# Patient Record
Sex: FEMALE | Race: WHITE | Employment: OTHER | URBAN - METROPOLITAN AREA
[De-identification: names, ages, dates, MRNs, and addresses within clinical notes are randomized per-mention and may not be internally consistent; named-entity substitution may affect disease eponyms.]

---

## 2019-09-05 ENCOUNTER — OFFICE VISIT (OUTPATIENT)
Dept: DERMATOLOGY | Facility: CLINIC | Age: 79
End: 2019-09-05
Payer: COMMERCIAL

## 2019-09-05 VITALS — SYSTOLIC BLOOD PRESSURE: 137 MMHG | HEART RATE: 55 BPM | OXYGEN SATURATION: 99 % | DIASTOLIC BLOOD PRESSURE: 70 MMHG

## 2019-09-05 DIAGNOSIS — L81.4 LENTIGO: ICD-10-CM

## 2019-09-05 DIAGNOSIS — L82.1 SEBORRHEIC KERATOSIS: ICD-10-CM

## 2019-09-05 DIAGNOSIS — D18.01 CHERRY ANGIOMA: ICD-10-CM

## 2019-09-05 DIAGNOSIS — D22.9 MULTIPLE BENIGN NEVI: ICD-10-CM

## 2019-09-05 DIAGNOSIS — L82.0 INFLAMED SEBORRHEIC KERATOSIS: Primary | ICD-10-CM

## 2019-09-05 PROCEDURE — 99203 OFFICE O/P NEW LOW 30 MIN: CPT | Mod: 25 | Performed by: PHYSICIAN ASSISTANT

## 2019-09-05 PROCEDURE — 17110 DESTRUCTION B9 LES UP TO 14: CPT | Performed by: PHYSICIAN ASSISTANT

## 2019-09-05 RX ORDER — MULTIVIT-MIN/IRON/FA/VIT K/LUT 8MG-400MCG
TABLET ORAL
COMMUNITY
Start: 2011-02-18

## 2019-09-05 RX ORDER — LISINOPRIL 20 MG/1
20 TABLET ORAL
COMMUNITY
Start: 2019-05-28

## 2019-09-05 RX ORDER — ATENOLOL 50 MG/1
50 TABLET ORAL
COMMUNITY
Start: 2019-05-28

## 2019-09-05 NOTE — PATIENT INSTRUCTIONS
WOUND CARE INSTRUCTIONS   FOR CRYOSURGERY   Left breast x2  This area treated with liquid nitrogen should form a blister (areas treated may or may not blister-skin may just turn dark and slough off). You do not need to bandage the area unless a blister forms and breaks (which may be a few days). When the blister breaks, begin daily dressing changes as follows:  1) Clean and dry the area with tap water using clean Q-tip or sterile gauze pad.   2) Apply Polysporin ointment or Bacitracin ointment over entire wound. Do NOT use Neosporin ointment.   3) Cover the wound with a band-aid or sterile non-stick gauze pad and micropore paper tape.   REPEAT THESE INSTRUCTIONS AT LEAST ONCE A DAY UNTIL THE WOUND HAS COMPLETELY HEALED.   It is an old wives tale that a wound heals better when it is exposed to air and allowed to dry out. The wound will heal faster with a better cosmetic result if it is kept moist with ointment and covered with a bandage.   Do not let the wound dry out.   IMPORTANT INFORMATION ON REVERSE SIDE   Supplies Needed:   *Cotton tipped applicators (Q-tips)   *Polysporin ointment or Bacitracin ointment (NOT NEOSPORIN)   *Band-aids, or non stick gauze pads and micropore paper tape   PATIENT INFORMATION   During the healing process you will notice a number of changes. All wounds develop a small halo of redness surrounding the wound. This means healing is occurring. Severe itching with extensive redness usually indicates sensitivity to the ointment or bandage tape used to dress the wound. You should call our office if this develops.   Swelling and/or discoloration around your surgical site is common, particularly when performed around the eye.   All wounds normally drain. The larger the wound the more drainage there will be. After 7-10 days, you will notice the wound beginning to shrink and new skin will begin to grow. The wound is healed when you can see skin has formed over the entire area. A healed wound has a  healthy, shiny look to the surface and is red to dark pink in color to normalize. Wounds may take approximately 4-6 weeks to heal. Larger wounds may take 6-8 weeks. After the wound is healed you may discontinue dressing changes.   You may experience a sensation of tightness as your wound heals. This is normal and will gradually subside.   Your healed wound may be sensitive to temperature changes. This sensitivity improves with time, but if you re having a lot of discomfort, try to avoid temperature extremes.   Patients frequently experience itching after their wound appears to have healed because of the continue healing under the skin. Plain Vaseline will help relieve the itching.

## 2019-09-05 NOTE — PROGRESS NOTES
Radha Chao is a 78 year old year old female patient here today for spot on left breast.  Patient states this has been present for many months.  Patient reports the following symptoms:  itchy.  Patient reports the following previous treatments none. Patient notes she has had a few benign spots removed. Has  Patient reports the following modifying factors none.  Associated symptoms: none.  Patient has no other skin complaints today.  Remainder of the HPI, Meds, PMH, Allergies, FH, and SH was reviewed in chart.    Pertinent Hx:   No personal history of skin cancer.   History reviewed. No pertinent past medical history.    History reviewed. No pertinent surgical history.     Family History   Problem Relation Age of Onset     Cancer Mother         BCC and SCC     Cancer Father         BCC and SCC     Cancer Brother         BCC and SCC       Social History     Socioeconomic History     Marital status: Single     Spouse name: Not on file     Number of children: Not on file     Years of education: Not on file     Highest education level: Not on file   Occupational History     Not on file   Social Needs     Financial resource strain: Not on file     Food insecurity:     Worry: Not on file     Inability: Not on file     Transportation needs:     Medical: Not on file     Non-medical: Not on file   Tobacco Use     Smoking status: Former Smoker     Smokeless tobacco: Never Used   Substance and Sexual Activity     Alcohol use: Not on file     Drug use: Not on file     Sexual activity: Not on file   Lifestyle     Physical activity:     Days per week: Not on file     Minutes per session: Not on file     Stress: Not on file   Relationships     Social connections:     Talks on phone: Not on file     Gets together: Not on file     Attends Yazidi service: Not on file     Active member of club or organization: Not on file     Attends meetings of clubs or organizations: Not on file     Relationship status: Not on file     Intimate  partner violence:     Fear of current or ex partner: Not on file     Emotionally abused: Not on file     Physically abused: Not on file     Forced sexual activity: Not on file   Other Topics Concern     Not on file   Social History Narrative     Not on file       Outpatient Encounter Medications as of 9/5/2019   Medication Sig Dispense Refill     atenolol (TENORMIN) 50 MG tablet Take 50 mg by mouth       calcium carbonate 600 mg-vitamin D 400 units (CALTRATE) 600-400 MG-UNIT per tablet Take 1 tablet by mouth       lisinopril (PRINIVIL/ZESTRIL) 20 MG tablet Take 20 mg by mouth       Multiple Vitamins-Minerals (CENTRUM SILVER ULTRA WOMENS) TABS        Multiple Vitamins-Minerals (ICAPS AREDS 2 PO) Take 1 capsule by mouth       ranitidine (ZANTAC) 150 MG capsule Take 150 mg by mouth       No facility-administered encounter medications on file as of 9/5/2019.              Review Of Systems  Skin: As above  Eyes: negative  Ears/Nose/Throat: negative  Respiratory: No shortness of breath, dyspnea on exertion, cough, or hemoptysis  Cardiovascular: negative  Gastrointestinal: negative  Genitourinary: negative  Musculoskeletal: negative  Neurologic: negative  Psychiatric: negative  Hematologic/Lymphatic/Immunologic: negative  Endocrine: negative      O:   NAD, WDWN, Alert & Oriented, Mood & Affect wnl, Vitals stable   Here today alone   /70 (BP Location: Right arm, Patient Position: Sitting, Cuff Size: Adult Regular)   Pulse 55   SpO2 99%    General appearance normal   Vitals stable   Alert, oriented and in no acute distress       Stuck on papules and brown macules on trunk and ext   Red papules on trunk  Brown papules and macules with regular pigment network and borders on torso and extremities      The remainder of the detailed exam was unremarkable; the following areas were examined:  scalp/hair, conjunctiva/lids, face, neck, lips/teeth, oral mucosa/gingiva, chest, back, abdomen, buttocks, digits/nails, RUE, LUE,  RLE, LLE.      Eyes: Conjunctivae/lids:Normal     ENT: Lips: normal    MSK:Normal    Cardiovascular: peripheral edema none    Pulm: Breathing Normal    Neuro/Psych: Orientation:Normal; Mood/Affect:Normal  A/P:  1. Inflamed seborrheic keratosis on left chest x2  LN2:  Treated with LN2 for 5s for 1-2 cycles. Warned risks of blistering, pain, pigment change, scarring, and incomplete resolution.  Advised patient to return if lesions do not completely resolve.  Wound care sheet given.  3. Seborrheic keratosis, lentigo, angioma, benign nevi   BENIGN LESIONS DISCUSSED WITH PATIENT:  I discussed the specifics of tumor, prognosis, and genetics of benign lesions.  I explained that treatment of these lesions would be purely cosmetic and not medically neccessary.  I discussed with patient different removal options including excision, cautery and /or laser.      Nature and genetics of benign skin lesions dicussed with patient.  Signs and Symptoms of skin cancer discussed with patient.  ABCDEs of melanoma reviewed with patient.  Patient encouraged to perform monthly skin exams.  UV precautions reviewed with patient.  Risks of non-melanoma skin cancer discussed with patient   Return to clinic one year or sooner if needed.

## 2019-09-05 NOTE — LETTER
9/5/2019         RE: Radha Chao  [address] [place] [id number]        Dear Colleague,    Thank you for referring your patient, Radha Chao, to the [facility]. Please see a copy of my visit note below.    Radha Chao is a 78 year old year old female patient here today for spot on left breast.  Patient states this has been present for many months.  Patient reports the following symptoms:  itchy.  Patient reports the following previous treatments none. Patient notes she has had a few benign spots removed. Has  Patient reports the following modifying factors none.  Associated symptoms: none.  Patient has no other skin complaints today.  Remainder of the HPI, Meds, PMH, Allergies, FH, and SH was reviewed in chart.    Pertinent Hx:   No personal history of skin cancer.   History reviewed. No pertinent past medical history.    History reviewed. No pertinent surgical history.     Family History   Problem Relation Age of Onset     Cancer Mother         BCC and SCC     Cancer Father         BCC and SCC     Cancer Brother         BCC and SCC       Social History     Socioeconomic History     Marital status: Single     Spouse name: Not on file     Number of children: Not on file     Years of education: Not on file     Highest education level: Not on file   Occupational History     Not on file   Social Needs     Financial resource strain: Not on file     Food insecurity:     Worry: Not on file     Inability: Not on file     Transportation needs:     Medical: Not on file     Non-medical: Not on file   Tobacco Use     Smoking status: Former Smoker     Smokeless tobacco: Never Used   Substance and Sexual Activity     Alcohol use: Not on file     Drug use: Not on file     Sexual activity: Not on file   Lifestyle     Physical activity:     Days per week: Not on file     Minutes per session: Not on file     Stress: Not on file   Relationships     Social connections:     Talks on phone: Not on file      Gets together: Not on file     Attends Religion service: Not on file     Active member of club or organization: Not on file     Attends meetings of clubs or organizations: Not on file     Relationship status: Not on file     Intimate partner violence:     Fear of current or ex partner: Not on file     Emotionally abused: Not on file     Physically abused: Not on file     Forced sexual activity: Not on file   Other Topics Concern     Not on file   Social History Narrative     Not on file       Outpatient Encounter Medications as of 9/5/2019   Medication Sig Dispense Refill     atenolol (TENORMIN) 50 MG tablet Take 50 mg by mouth       calcium carbonate 600 mg-vitamin D 400 units (CALTRATE) 600-400 MG-UNIT per tablet Take 1 tablet by mouth       lisinopril (PRINIVIL/ZESTRIL) 20 MG tablet Take 20 mg by mouth       Multiple Vitamins-Minerals (CENTRUM SILVER ULTRA WOMENS) TABS        Multiple Vitamins-Minerals (ICAPS AREDS 2 PO) Take 1 capsule by mouth       ranitidine (ZANTAC) 150 MG capsule Take 150 mg by mouth       No facility-administered encounter medications on file as of 9/5/2019.              Review Of Systems  Skin: As above  Eyes: negative  Ears/Nose/Throat: negative  Respiratory: No shortness of breath, dyspnea on exertion, cough, or hemoptysis  Cardiovascular: negative  Gastrointestinal: negative  Genitourinary: negative  Musculoskeletal: negative  Neurologic: negative  Psychiatric: negative  Hematologic/Lymphatic/Immunologic: negative  Endocrine: negative      O:   NAD, WDWN, Alert & Oriented, Mood & Affect wnl, Vitals stable   Here today alone   /70 (BP Location: Right arm, Patient Position: Sitting, Cuff Size: Adult Regular)   Pulse 55   SpO2 99%    General appearance normal   Vitals stable   Alert, oriented and in no acute distress       Stuck on papules and brown macules on trunk and ext   Red papules on trunk  Brown papules and macules with regular pigment network and borders on torso and  extremities      The remainder of the detailed exam was unremarkable; the following areas were examined:  scalp/hair, conjunctiva/lids, face, neck, lips/teeth, oral mucosa/gingiva, chest, back, abdomen, buttocks, digits/nails, RUE, LUE, RLE, LLE.      Eyes: Conjunctivae/lids:Normal     ENT: Lips: normal    MSK:Normal    Cardiovascular: peripheral edema none    Pulm: Breathing Normal    Neuro/Psych: Orientation:Normal; Mood/Affect:Normal  A/P:  1. Inflamed seborrheic keratosis on left chest x2  LN2:  Treated with LN2 for 5s for 1-2 cycles. Warned risks of blistering, pain, pigment change, scarring, and incomplete resolution.  Advised patient to return if lesions do not completely resolve.  Wound care sheet given.  3. Seborrheic keratosis, lentigo, angioma, benign nevi   BENIGN LESIONS DISCUSSED WITH PATIENT:  I discussed the specifics of tumor, prognosis, and genetics of benign lesions.  I explained that treatment of these lesions would be purely cosmetic and not medically neccessary.  I discussed with patient different removal options including excision, cautery and /or laser.      Nature and genetics of benign skin lesions dicussed with patient.  Signs and Symptoms of skin cancer discussed with patient.  ABCDEs of melanoma reviewed with patient.  Patient encouraged to perform monthly skin exams.  UV precautions reviewed with patient.  Risks of non-melanoma skin cancer discussed with patient   Return to clinic one year or sooner if needed.       Again, thank you for allowing me to participate in the care of your patient.        Sincerely,        Lexie Abad PA-C

## 2024-09-17 ENCOUNTER — LAB REQUISITION (OUTPATIENT)
Dept: LAB | Facility: CLINIC | Age: 84
End: 2024-09-17
Payer: COMMERCIAL

## 2024-09-17 DIAGNOSIS — D64.9 ANEMIA, UNSPECIFIED: ICD-10-CM

## 2024-09-17 DIAGNOSIS — I10 ESSENTIAL (PRIMARY) HYPERTENSION: ICD-10-CM

## 2024-09-17 DIAGNOSIS — E55.9 VITAMIN D DEFICIENCY, UNSPECIFIED: ICD-10-CM

## 2024-09-18 LAB
ALBUMIN SERPL BCG-MCNC: 3.9 G/DL (ref 3.5–5.2)
ALP SERPL-CCNC: 88 U/L (ref 40–150)
ALT SERPL W P-5'-P-CCNC: 7 U/L (ref 0–50)
ANION GAP SERPL CALCULATED.3IONS-SCNC: 12 MMOL/L (ref 7–15)
AST SERPL W P-5'-P-CCNC: 16 U/L (ref 0–45)
BASOPHILS # BLD AUTO: 0 10E3/UL (ref 0–0.2)
BASOPHILS NFR BLD AUTO: 0 %
BILIRUB SERPL-MCNC: 0.3 MG/DL
BUN SERPL-MCNC: 19.8 MG/DL (ref 8–23)
CALCIUM SERPL-MCNC: 9 MG/DL (ref 8.8–10.4)
CHLORIDE SERPL-SCNC: 102 MMOL/L (ref 98–107)
CREAT SERPL-MCNC: 1.14 MG/DL (ref 0.51–0.95)
EGFRCR SERPLBLD CKD-EPI 2021: 47 ML/MIN/1.73M2
EOSINOPHIL # BLD AUTO: 0.1 10E3/UL (ref 0–0.7)
EOSINOPHIL NFR BLD AUTO: 2 %
ERYTHROCYTE [DISTWIDTH] IN BLOOD BY AUTOMATED COUNT: 13.5 % (ref 10–15)
GLUCOSE SERPL-MCNC: 134 MG/DL (ref 70–99)
HCO3 SERPL-SCNC: 25 MMOL/L (ref 22–29)
HCT VFR BLD AUTO: 41.1 % (ref 35–47)
HGB BLD-MCNC: 12.8 G/DL (ref 11.7–15.7)
IMM GRANULOCYTES # BLD: 0 10E3/UL
IMM GRANULOCYTES NFR BLD: 0 %
LYMPHOCYTES # BLD AUTO: 1.3 10E3/UL (ref 0.8–5.3)
LYMPHOCYTES NFR BLD AUTO: 18 %
MCH RBC QN AUTO: 28 PG (ref 26.5–33)
MCHC RBC AUTO-ENTMCNC: 31.1 G/DL (ref 31.5–36.5)
MCV RBC AUTO: 90 FL (ref 78–100)
MONOCYTES # BLD AUTO: 0.4 10E3/UL (ref 0–1.3)
MONOCYTES NFR BLD AUTO: 6 %
NEUTROPHILS # BLD AUTO: 5.1 10E3/UL (ref 1.6–8.3)
NEUTROPHILS NFR BLD AUTO: 73 %
NRBC # BLD AUTO: 0 10E3/UL
NRBC BLD AUTO-RTO: 0 /100
PLATELET # BLD AUTO: 244 10E3/UL (ref 150–450)
POTASSIUM SERPL-SCNC: 3.6 MMOL/L (ref 3.4–5.3)
PROT SERPL-MCNC: 6.8 G/DL (ref 6.4–8.3)
RBC # BLD AUTO: 4.57 10E6/UL (ref 3.8–5.2)
SODIUM SERPL-SCNC: 139 MMOL/L (ref 135–145)
VIT D+METAB SERPL-MCNC: 57 NG/ML (ref 20–50)
WBC # BLD AUTO: 7 10E3/UL (ref 4–11)

## 2024-09-18 PROCEDURE — 85025 COMPLETE CBC W/AUTO DIFF WBC: CPT | Mod: ORL | Performed by: NURSE PRACTITIONER

## 2024-09-18 PROCEDURE — 80053 COMPREHEN METABOLIC PANEL: CPT | Mod: ORL | Performed by: NURSE PRACTITIONER

## 2024-09-18 PROCEDURE — P9604 ONE-WAY ALLOW PRORATED TRIP: HCPCS | Mod: ORL | Performed by: NURSE PRACTITIONER

## 2024-09-18 PROCEDURE — 82306 VITAMIN D 25 HYDROXY: CPT | Mod: ORL | Performed by: NURSE PRACTITIONER

## 2024-09-18 PROCEDURE — 36415 COLL VENOUS BLD VENIPUNCTURE: CPT | Mod: ORL | Performed by: NURSE PRACTITIONER

## 2025-06-30 ENCOUNTER — LAB REQUISITION (OUTPATIENT)
Dept: LAB | Facility: CLINIC | Age: 85
End: 2025-06-30
Payer: COMMERCIAL

## 2025-06-30 DIAGNOSIS — R17 UNSPECIFIED JAUNDICE: ICD-10-CM

## 2025-07-02 LAB
ALBUMIN SERPL BCG-MCNC: 4.1 G/DL (ref 3.5–5.2)
ALP SERPL-CCNC: 88 U/L (ref 40–150)
ALT SERPL W P-5'-P-CCNC: 9 U/L (ref 0–50)
AMYLASE SERPL-CCNC: 50 U/L (ref 28–100)
ANION GAP SERPL CALCULATED.3IONS-SCNC: 13 MMOL/L (ref 7–15)
AST SERPL W P-5'-P-CCNC: 19 U/L (ref 0–45)
BILIRUB SERPL-MCNC: 0.4 MG/DL
BUN SERPL-MCNC: 19.7 MG/DL (ref 8–23)
CALCIUM SERPL-MCNC: 9.3 MG/DL (ref 8.8–10.4)
CHLORIDE SERPL-SCNC: 103 MMOL/L (ref 98–107)
CREAT SERPL-MCNC: 1.18 MG/DL (ref 0.51–0.95)
EGFRCR SERPLBLD CKD-EPI 2021: 45 ML/MIN/1.73M2
GLUCOSE SERPL-MCNC: 92 MG/DL (ref 70–99)
HCO3 SERPL-SCNC: 24 MMOL/L (ref 22–29)
LIPASE SERPL-CCNC: 65 U/L (ref 13–60)
POTASSIUM SERPL-SCNC: 3.4 MMOL/L (ref 3.4–5.3)
PROT SERPL-MCNC: 7 G/DL (ref 6.4–8.3)
SODIUM SERPL-SCNC: 140 MMOL/L (ref 135–145)

## 2025-07-02 PROCEDURE — 83690 ASSAY OF LIPASE: CPT | Mod: ORL | Performed by: NURSE PRACTITIONER

## 2025-07-02 PROCEDURE — P9604 ONE-WAY ALLOW PRORATED TRIP: HCPCS | Mod: ORL | Performed by: NURSE PRACTITIONER

## 2025-07-02 PROCEDURE — 36415 COLL VENOUS BLD VENIPUNCTURE: CPT | Mod: ORL | Performed by: NURSE PRACTITIONER

## 2025-07-02 PROCEDURE — 80053 COMPREHEN METABOLIC PANEL: CPT | Mod: ORL | Performed by: NURSE PRACTITIONER

## 2025-07-02 PROCEDURE — 82150 ASSAY OF AMYLASE: CPT | Mod: ORL | Performed by: NURSE PRACTITIONER
